# Patient Record
Sex: MALE | Race: WHITE
[De-identification: names, ages, dates, MRNs, and addresses within clinical notes are randomized per-mention and may not be internally consistent; named-entity substitution may affect disease eponyms.]

---

## 2017-12-18 ENCOUNTER — HOSPITAL ENCOUNTER (INPATIENT)
Dept: HOSPITAL 53 - M ED | Age: 77
LOS: 2 days | Discharge: HOME | DRG: 194 | End: 2017-12-20
Attending: INTERNAL MEDICINE | Admitting: INTERNAL MEDICINE
Payer: MEDICARE

## 2017-12-18 VITALS — SYSTOLIC BLOOD PRESSURE: 110 MMHG | DIASTOLIC BLOOD PRESSURE: 57 MMHG

## 2017-12-18 VITALS — SYSTOLIC BLOOD PRESSURE: 115 MMHG | DIASTOLIC BLOOD PRESSURE: 56 MMHG

## 2017-12-18 VITALS — HEIGHT: 68 IN | WEIGHT: 166.45 LBS | BODY MASS INDEX: 25.23 KG/M2

## 2017-12-18 DIAGNOSIS — J44.9: ICD-10-CM

## 2017-12-18 DIAGNOSIS — Z79.82: ICD-10-CM

## 2017-12-18 DIAGNOSIS — E78.5: ICD-10-CM

## 2017-12-18 DIAGNOSIS — N40.0: ICD-10-CM

## 2017-12-18 DIAGNOSIS — I10: ICD-10-CM

## 2017-12-18 DIAGNOSIS — Z94.81: ICD-10-CM

## 2017-12-18 DIAGNOSIS — E11.9: ICD-10-CM

## 2017-12-18 DIAGNOSIS — J18.9: Primary | ICD-10-CM

## 2017-12-18 DIAGNOSIS — K21.9: ICD-10-CM

## 2017-12-18 DIAGNOSIS — Z79.84: ICD-10-CM

## 2017-12-18 DIAGNOSIS — Z92.21: ICD-10-CM

## 2017-12-18 DIAGNOSIS — Z95.5: ICD-10-CM

## 2017-12-18 DIAGNOSIS — Z87.891: ICD-10-CM

## 2017-12-18 DIAGNOSIS — D84.9: ICD-10-CM

## 2017-12-18 DIAGNOSIS — Z88.8: ICD-10-CM

## 2017-12-18 DIAGNOSIS — I25.10: ICD-10-CM

## 2017-12-18 DIAGNOSIS — Z79.899: ICD-10-CM

## 2017-12-18 LAB
ALBUMIN SERPL BCG-MCNC: 3.2 GM/DL (ref 3.2–5.2)
ALBUMIN/GLOB SERPL: 1.03 {RATIO} (ref 1–1.93)
ALP SERPL-CCNC: 66 U/L (ref 45–117)
ALT SERPL W P-5'-P-CCNC: 27 U/L (ref 12–78)
ANION GAP SERPL CALC-SCNC: 10 MEQ/L (ref 8–16)
AST SERPL-CCNC: 26 U/L (ref 7–37)
BASOPHILS # BLD AUTO: 0 10^3/UL (ref 0–0.2)
BASOPHILS NFR BLD AUTO: 0.2 % (ref 0–1)
BILIRUB CONJ SERPL-MCNC: 0.1 MG/DL (ref 0–0.2)
BILIRUB SERPL-MCNC: 0.4 MG/DL (ref 0.2–1)
BUN SERPL-MCNC: 35 MG/DL (ref 7–18)
CALCIUM SERPL-MCNC: 8.4 MG/DL (ref 8.8–10.2)
CHLORIDE SERPL-SCNC: 104 MEQ/L (ref 98–107)
CO2 SERPL-SCNC: 26 MEQ/L (ref 21–32)
CREAT SERPL-MCNC: 1.3 MG/DL (ref 0.7–1.3)
DIGOXIN SERPL-MCNC: 1.1 NG/ML (ref 0.5–2)
EOSINOPHIL # BLD AUTO: 0 10^3/UL (ref 0–0.5)
EOSINOPHIL NFR BLD AUTO: 0.2 % (ref 0–3)
ERYTHROCYTE [DISTWIDTH] IN BLOOD BY AUTOMATED COUNT: 13.7 % (ref 11.5–14.5)
GFR SERPL CREATININE-BSD FRML MDRD: 57.3 ML/MIN/{1.73_M2} (ref 42–?)
GLUCOSE SERPL-MCNC: 205 MG/DL (ref 83–110)
IMM GRANULOCYTES NFR BLD: 0.5 % (ref 0–0)
LYMPHOCYTES # BLD AUTO: 1.1 10^3/UL (ref 1.5–4.5)
LYMPHOCYTES NFR BLD AUTO: 8.7 % (ref 24–44)
MCH RBC QN AUTO: 36.1 PG (ref 27–33)
MCHC RBC AUTO-ENTMCNC: 34 G/DL (ref 32–36.5)
MCV RBC AUTO: 106.1 FL (ref 80–96)
MONOCYTES # BLD AUTO: 0.6 10^3/UL (ref 0–0.8)
MONOCYTES NFR BLD AUTO: 4.6 % (ref 0–5)
NEUTROPHILS # BLD AUTO: 10.6 10^3/UL (ref 1.8–7.7)
NEUTROPHILS NFR BLD AUTO: 85.8 % (ref 36–66)
NRBC BLD AUTO-RTO: 0 % (ref 0–0)
PLATELET # BLD AUTO: 209 10^3/UL (ref 150–450)
POTASSIUM SERPL-SCNC: 4.8 MEQ/L (ref 3.5–5.1)
PROT SERPL-MCNC: 6.3 GM/DL (ref 6.4–8.2)
SODIUM SERPL-SCNC: 140 MEQ/L (ref 136–145)
WBC # BLD AUTO: 12.3 10^3/UL (ref 4–10)

## 2017-12-18 RX ADMIN — METOPROLOL TARTRATE SCH MG: 25 TABLET, FILM COATED ORAL at 21:59

## 2017-12-18 RX ADMIN — FLUTICASONE PROPIONATE AND SALMETEROL XINAFOATE SCH PUFF: 230; 21 AEROSOL, METERED RESPIRATORY (INHALATION) at 20:52

## 2017-12-18 RX ADMIN — MONTELUKAST SODIUM SCH MG: 10 TABLET, FILM COATED ORAL at 21:56

## 2017-12-18 RX ADMIN — FOLIC ACID SCH MG: 1 TABLET ORAL at 16:31

## 2017-12-18 RX ADMIN — VORICONAZOLE SCH MG: 200 TABLET ORAL at 21:56

## 2017-12-18 RX ADMIN — PIPERACILLIN SODIUM AND TAZOBACTAM SODIUM SCH MLS/HR: 36; 4.5 INJECTION, POWDER, FOR SOLUTION INTRAVENOUS at 13:50

## 2017-12-18 RX ADMIN — DIGOXIN SCH MG: 125 TABLET ORAL at 16:31

## 2017-12-18 RX ADMIN — PIPERACILLIN SODIUM AND TAZOBACTAM SODIUM SCH MLS/HR: 36; 4.5 INJECTION, POWDER, FOR SOLUTION INTRAVENOUS at 18:00

## 2017-12-18 RX ADMIN — DUTASTERIDE SCH MG: 0.5 CAPSULE, LIQUID FILLED ORAL at 21:56

## 2017-12-18 RX ADMIN — CETIRIZINE HYDROCHLORIDE SCH MG: 10 TABLET, FILM COATED ORAL at 21:56

## 2017-12-18 RX ADMIN — Medication SCH EA: at 23:37

## 2017-12-18 RX ADMIN — ACYCLOVIR SCH MG: 200 CAPSULE ORAL at 21:55

## 2017-12-18 RX ADMIN — ATORVASTATIN CALCIUM SCH MG: 20 TABLET, FILM COATED ORAL at 21:56

## 2017-12-18 RX ADMIN — DEXTROSE MONOHYDRATE SCH MLS/HR: 50 INJECTION, SOLUTION INTRAVENOUS at 16:30

## 2017-12-18 RX ADMIN — DOCUSATE SODIUM SCH MG: 100 CAPSULE, LIQUID FILLED ORAL at 21:56

## 2017-12-18 RX ADMIN — Medication SCH UNITS: at 16:30

## 2017-12-18 RX ADMIN — OMEPRAZOLE SCH MG: 20 CAPSULE, DELAYED RELEASE ORAL at 16:31

## 2017-12-18 RX ADMIN — HYDROXYCHLOROQUINE SULFATE SCH MG: 200 TABLET, FILM COATED ENTERAL at 21:56

## 2017-12-18 NOTE — REPUSA
CLINICAL HISTORY: Dyspnea, exclude PE.

TECHNIQUE: Multiple incremental axial, coronal and oblique images are obtained from the thoracic inle
t to the upper abdomen. Intravenous contrast material was administered as per pulmonary embolism prot
ocol.

COMMENTS:

Comparison to prior exam performed on 12/17/2014.

Small left pleural effusion.

Left lower lobe pulmonary consolidation.

Prior CABG.

2.5 cm left hypoechoic cystic nodule/cyst. Mildly increased in size.

Complete resolution of the patient noted large pericardial effusion.

Minimal bilateral multifocal air trapping/hypoventilatory changes of the lungs.

Significant decrease in bilateral ground glass densities of the lungs.

Unchanged bilateral chronic subdural minimal interstitial pulmonary thickening.

Unchanged paraseptal emphysema.

Multiple gallstones are noted.

Diffusely thickened gallbladder.

Unchanged cardiomegaly.

There is excellent opacification of pulmonary arterial system without evidence for pulmonary embolism
. Aorta is of normal caliber without evidence for dissection or aneurysm.

There is no evidence of hilar or mediastinal lymphadenopathy.

Images of the upper abdomen demonstrate no evidence of adrenal mass.

The bony structures are free of lytic or blastic lesions. Multilevel degenerative changes are seen in
volving the visualized thoracolumbar spine.

Scattered calcifications are seen involving the aorta and major branches compatible with atherosclero
sis.

IMPRESSION:

No evidence for pulmonary embolism.

Increase in the size of the left lower lobe pulmonary consolidation.

Interval appearance of a small left pleural effusion.

Mild increase in the size of the left thyroid cyst/nodule.

Resolution of pericardial effusion.

Decreased bilateral patchy groundglass densities of the lungs.

Cholelithiasis. The gallbladder was not fully included on the prior exam.

Mild thickening of the wall of the gallbladder.

Thank you for your kind referral of this patient.

     Electronically signed by FAUSTO LORENZ MD on 12/18/2017 08:09:16 AM ET

## 2017-12-18 NOTE — PHACANCOPD
PHARMACY VANCOMYCIN DOSING


Pt Demographics


Demographics


Patient Age:77 , Weight:68.180  , Gender: male


Adjusted Body Weight


Date: 12/18/17, Adjusted Body Weight: [68.18] Kg





Events Past 24 Hours


Events Past 24 Hours:  YES: Dialysis, Diuretic Therapy, Change in CrCl, Fever, 

Elevation in WBC, Pending Diagnostics, Pending Procedures, Other





Vancomycin


Vancomycin indication:  pna


Vancomycin Target Ranges:  15-20 mcg/ml


Vancomycin Load Y/N:  Yes


Load Dose Date Time


Vancomycin Load Dose:  1.5g       Date: 12/18/17        Time: 1300


Vancomycin Dose


Date: 12/18/17. Current Vancomycin Dose: [1g iv q24h]


Intermittent Dosing?:  No





Labs


Micro





Microbiology


12/18/17 Blood Culture, Received


           Pending


12/18/17 Blood Culture, Received


           Pending


12/18/17 Influenza Virus Type A Antigen - Final, Complete


           


12/18/17 Influenza Virus Type B Antigen - Final, Complete


Creatinine Clearance


Date:12/18/17. Creatinine Clearance: [47.5ml/min].


Pending Labs


blood culture





Assessment and Plan


Maintaining Current Dose?:  Yes


Reason for dose change:  No Dose Change





Pharmacist Note


Pharmacist Note


Date: 12/18/17. Pharmacist note: Pt is a 77 year old man being treated for 

pneumonia goal trough 15-20mcg/ml. The patient has been treated with vancomycin 

at [facility] briefly in 2014. To achieve goal a 1.5g loading dose will start 12/18/17 

@13. Maintenance therapy will consist of 1g iv q24 hours starting 12/19 @ 13:

00. We will continue to monitor and adjust dose as needed.











IAN NAVA PHARMACY Dec 18, 2017 11:42

## 2017-12-18 NOTE — HPEPDOC
[facility] Medical History & Physical


Date of Admission


Dec 18, 2017





History and Physical


PRIMARY CARE PROVIDER: Dr. Oleg Bradshaw





ATTENDING: Dr. Dell Graves





CHIEF COMPLAINT: SOB/Cough





HISTORY OF PRESENT ILLNESS: 


Some 27-year-old male past history of myelodysplastic disorder status post 


 bone marrow transplant 2012, status post pqnia-kqilad-cnxz disease in 2014, 

currently on immunosuppressants, history of CAD status post CABG 3 in 2014, 

diabetes, pericardial effusion in 2014 who presents complaining of cough and 

shortness of breath. 





Patient states he started having shortness of breath and a nonproductive cough 

starting yesterday which progressively worsened overnight. Patient denies any 

fevers or chills. No sick contacts. No recent travels. Has not been 

hospitalized in the last 90 days.





Patient does have left-sided chest pain that is double nature and worse with 

exertion, in nonreproducible. Patient states this pain pleuritic in nature. 

Pain has resolved at this time.  Patient denies orthopnea/PND/lower extremity 

edema/syncopal episodes.





In the ED patient was found to have pneumonia and started on azithromycin and 

Rocephin.











PAST MEDICAL HISTORY: As per HPI





PAST SURGICAL HISTORY: Appendectomy, CABG, BM Transplant, Adenoids





SOCIAL HISTORY: H/o tobacco abuse quit in 1977. Denies alcohol. Lives with wife.





FAMILY HISTORY: Non contributory 





ALLERGIES: Please see below.





REVIEW OF SYSTEMS:


HEENT: Denies sore throat/headache


CARDIOVASCULAR: Denies chest pain/palpitations


RESPIRATORY: + shortness of breath/cough


GASTROINTESTINAL: denies nausea/vomiting


GENITOURINARY: Denies dysuria/urinary urgency.


MUSCULOSKELETAL: Denies myalgias/arthralgias


NEUROLOGICAL: Denies any focal weakness








HOME MEDICATIONS: Please see below. 





PHYSICAL EXAMINATION:


Vitals: (see below) 


General: No acute distress, laying comfortably in bed.


HEENT: Moist mucous membranes.


Neck: No JVD or lymphadenopathy


Cardiac: RRR, No murmurs


Pulm:  Diminished breath sounds and course crackles left base. No wheezing, 

rhonchi


Abd: NT/ND + BS


Ext: No edema or cyanosis





LABORATORY DATA: See below.





IMAGING: 


CTA Chest 12/18/17 


IMPRESSION:


No evidence for pulmonary embolism.


Increase in the size of the left lower lobe pulmonary consolidation.


Interval appearance of a small left pleural effusion.


Mild increase in the size of the left thyroid cyst/nodule.


Resolution of pericardial effusion.


Decreased bilateral patchy groundglass densities of the lungs.


Cholelithiasis. The gallbladder was not fully included on the prior exam.


Mild thickening of the wall of the gallbladder.





MICROBIOLOGY: Please see below. 





ASSESSMENT/PLAN: 


1. Community acquired pneumonia- given that the patient's immunosuppressed, we 

will start the patient on broad-spectrum antibiotics with vancomycin and Zosyn, 

pending cultures. Patient is not hypoxic. Leukocytosis of 23. Afebrile. Blood/

sputum cultures.


2. History of CAD status post bypass 3 continue home meds


3. Pleuritic chest pain- CTA negative for PE. Given the patient's history of 

pericardial effusion, we will obtain echocardiogram. Trend cardiac enzymes.


4. History jxbko-bxplbr-khxn disease.


5. History of myelodysplastic syndrome on immunosuppressants status post bone 

marrow transplant 2012


6. Diabetes mellitus- hold metformin. Sliding scale insulin.





DVT prophylaxis SCDs





I have called and discussed the case with the patient's transplants seen (Dr. Ross's office), spoke with the PA, who were agreeable to the current antibiotic 

regimen.  These call their office with any questions or concerns and [phone].





Vital Signs





Vital Signs








  Date Time  Temp Pulse Resp B/P (MAP) Pulse Ox O2 Delivery O2 Flow Rate FiO2


 


12/18/17 13:17  80  120/58 (78) 96   


 


12/18/17 11:02 98.9  16     


 


12/18/17 05:22      Room Air  











Laboratory Data


Labs 24H


Laboratory Tests 2


12/18/17 05:40: 


12/18/17 05:41: 


Immature Granulocyte % (Auto) 0.5H, White Blood Count 12.3H, Red Blood Count 

2.94L, Hemoglobin 10.6L, Hematocrit 31.2L, Mean Corpuscular Volume 106.1H, Mean 

Corpuscular Hemoglobin 36.1H, Mean Corpuscular Hemoglobin Concent 34.0, Red 

Cell Distribution Width 13.7, Platelet Count 209, Neutrophils (%) (Auto) 85.8H, 

Lymphocytes (%) (Auto) 8.7L, Monocytes (%) (Auto) 4.6, Eosinophils (%) (Auto) 

0.2, Basophils (%) (Auto) 0.2, Neutrophils # (Auto) 10.6H, Lymphocytes # (Auto) 

1.1L, Monocytes # (Auto) 0.6, Eosinophils # (Auto) 0.0, Basophils # (Auto) 0.0, 

Immature Granulocyte # (Auto) 0.1H, Nucleated Red Blood Cells % (auto) 0.0, 

Anion Gap 10, Glomerular Filtration Rate 57.3, Calcium Level 8.4L, Aspartate 

Amino Transf (AST/SGOT) 26, Alanine Aminotransferase (ALT/SGPT) 27, Alkaline 

Phosphatase 66, Total Bilirubin 0.4, Direct Bilirubin 0.1, Total Creatine 

Kinase 129, Creatine Kinase MB 1.0, Creatine Kinase MB Relative Index 0.77, 

Troponin I 0.05, NT-Pro-B-Type Natriuretic Peptide 4125H, Total Protein 6.3L, 

Albumin 3.2, Albumin/Globulin Ratio 1.03, Digoxin Level 1.1


12/18/17 07:39: 


Urine Appearance HAZY, Urine Color YELLOW, Urine pH 5.0, Urine Specific Gravity 

1.024, Urine Protein 3+H, Urine Glucose (UA) 3+H, Urine Ketones TRACEH, Urine 

Urobilinogen 0.2, Urine Bilirubin NEGATIVE, Urine Leukocyte Esterase NEGATIVE, 

Urine Blood NEGATIVE, Urine Nitrite NEGATIVE, Urine WBC (Auto) 2, Urine RBC (

Auto) 3, Urine Hyaline Casts (Auto) 0, Urine Bacteria (Auto) NEGATIVE, Urine 

Squamous Epithelial Cells 0, Urine Mucus (Auto) SMALL, Urine Sperm (Auto) 


12/18/17 08:44: Lactic Acid Level 1.3


CBC/BMP


Laboratory Tests


12/18/17 05:41








Red Blood Count 2.94 L, Mean Corpuscular Volume 106.1 H, Mean Corpuscular 

Hemoglobin 36.1 H, Mean Corpuscular Hemoglobin Concent 34.0, Red Cell 

Distribution Width 13.7, Neutrophils (%) (Auto) 85.8 H, Lymphocytes (%) (Auto) 

8.7 L, Monocytes (%) (Auto) 4.6, Eosinophils (%) (Auto) 0.2, Basophils (%) (Auto

) 0.2, Neutrophils # (Auto) 10.6 H, Lymphocytes # (Auto) 1.1 L, Monocytes # (

Auto) 0.6, Eosinophils # (Auto) 0.0, Basophils # (Auto) 0.0


Microbiology





Microbiology


12/18/17 Blood Culture, Received


           Pending


12/18/17 Blood Culture, Received


           Pending


12/18/17 Influenza Virus Type A Antigen - Final, Complete


           


12/18/17 Influenza Virus Type B Antigen - Final, Complete





Home Medications


Scheduled


 (mg-Plus Protein 133 mg) 1 Tab Tab, 1 TAB PO TID


 (Imatinib Mesylate) 100 Mg Tab, 200 MG PO DAILY


   LUNCHTIME 


 (Digoxin) 125 Mcg Tab, 125 MCG PO DAILY


 (Fish Oil 500 mg) 1 Cap Cap, 2 CAP PO BID


Acyclovir (Acyclovir) 400 Mg Tab, 400 MG PO BID


Alendronate Sodium (Alendronate Sodium) 10 Mg Tab, 10 MG PO DAILY


Amoxicillin (Amoxicillin) 500 Mg Cap, 2,000 MG PO ASDIRECTED


Aspirin (Aspirin EC Lo-Dose) 81 Mg Tab, 81 MG PO QHS


Atorvastatin Calcium (Atorvastatin Calcium) 20 Mg Tab, 20 MG PO QHS


Calcium/Vitamin D (Calcium 600 + D 600-400 mg-Unit) 1 Tab Tab, 1 TAB PO BID


Cetirizine HCl (Cetirizine HCl) 10 Mg Tab, 10 MG PO QHS


Cholecalciferol ( Ultra Strength) 2,000 Unit Cap, 6,000 UNIT PO DAILY


Docusate Sodium (Colace) 100 Mg Cap, 100 MG PO BID


Doxycycline Hyclate (Doxycycline) 100 Mg Cap, 100 MG PO DAILY


   LUNCHTIME 


Dutasteride (Avodart) 0.5 Mg Cap, 0.5 MG PO QHS


Folic Acid (Folic Acid) 1 Mg Tab, 1 MG PO DAILY


Hydroxychloroquine Sulfate (Hydroxychloroquine Sulfat) 200 Mg Tab, 200 MG PO BID


Metformin Hydrochloride (Glucophage) 500 Mg Tab, 500 MG PO QHS


   HOLD DUE TO RECENT USE OF IV CONTRAST 


Metoprolol Tartrate (Metoprolol Tartrate) 25 Mg Tab, 12.5 MG PO BID


Montelukast Sodium (Singulair) 10 Mg Tab, 10 MG PO QPM


Omeprazole (Omeprazole) 20 Mg Cap, 20 MG PO DAILY


Salmeterol/Fluticasone (Advair Diskus 500-50 Mcg/Dose) 28 Puff/Inhaler Aerp, 1 

PUFF INH BID


Trimethoprim/Sulfamethoxazole (Bactrim Ds 800-160 mg) 1 Tab Tab, 1 TAB PO 3XW


   MONDAY,WEDNESDAY,FRIDAY 


Voriconazole (Voriconazole) 200 Mg Tab, 200 MG PO BID


[Tacrolimus Oral Susp]  ML, 0.8 MG PO BID


   0900,2100- PT BRINGING IN MEDICATION 





Allergies


Coded Allergies:  


     No Known Drug Allergy (Verified  Allergy, Unknown, 4/7/13)


     Lidocaine (Verified  Adverse Reaction, Severe, CONVULSIONS FROM ALL "MICHAEL

", 4/7/13)











DELL GRAVES MD Dec 18, 2017 15:04

## 2017-12-18 NOTE — PHACANCOPD
PHARMACY VANCOMYCIN DOSING


Pt Demographics


Demographics


Patient Age:77 , Weight:68.180  , Gender: male


Adjusted Body Weight


Date: 12/18/17, Adjusted Body Weight: [68.18] Kg





Events Past 24 Hours


Events Past 24 Hours:  NO: Dialysis, Diuretic Therapy, Change in CrCl, Fever, 

Elevation in WBC, Pending Diagnostics, Pending Procedures, Other





Vancomycin


Vancomycin indication:  pna


Vancomycin Target Ranges:  15-20 mcg/ml


Vancomycin Load Y/N:  Yes


Load Dose Date Time


Vancomycin Load Dose:  1.5g       Date: 12/18/17        Time: 1300


Vancomycin Dose


Date: 12/18/17. Current Vancomycin Dose: [1g iv q24h]


Intermittent Dosing?:  No





Labs


Labs











Item Value  Date Time


 


White Blood Count 12.3 10^3/uL H 12/18/17 0541


 


Creatinine 1.30 MG/DL 12/18/17 0541








Micro





Microbiology


12/18/17 Blood Culture, Received


           Pending


12/18/17 Blood Culture, Received


           Pending


12/18/17 Influenza Virus Type A Antigen - Final, Complete


           


12/18/17 Influenza Virus Type B Antigen - Final, Complete


Creatinine Clearance


Date:12/18/17. Creatinine Clearance: [47.5ml/min].


Pending Labs


blood culture





Assessment and Plan


Maintaining Current Dose?:  Yes


Reason for dose change:  No Dose Change





Pharmacist Note


Pharmacist Note


Date: 12/18/17. Pharmacist note: Pt is a 77 year old man being treated for 

pneumonia goal trough 15-20mcg/ml. The patient has been treated with vancomycin 

at [facility] briefly in 2014. To achieve goal a 1.5g loading dose will start 12/18/17 

@12. Maintenance therapy will consist of 1g iv q24 hours starting 12/19 @ 12:

00. We will continue to monitor and adjust dose as needed.











IAN NAVA PHARMACY Dec 18, 2017 11:41

## 2017-12-18 NOTE — REP
Portable chest x-ray:  Single view.

 

History:  Chest pain.

 

Comparison study:  December 19, 2014.

 

Findings:  There is elevation of the left hemidiaphragm and blunting of the left

lateral pleural angle consistent with small left pleural effusion.  The patient

is status post prior median sternotomy.  Remaining lung fields are clear.  No

significant bony abnormality is seen.

 

Impression:

 

Probable left pleural effusion.  Prior sternotomy.

 

 

Signed by

Best Acosta MD 12/18/2017 03:49 P

## 2017-12-19 VITALS — SYSTOLIC BLOOD PRESSURE: 123 MMHG | DIASTOLIC BLOOD PRESSURE: 65 MMHG

## 2017-12-19 VITALS — DIASTOLIC BLOOD PRESSURE: 62 MMHG | SYSTOLIC BLOOD PRESSURE: 103 MMHG

## 2017-12-19 VITALS — DIASTOLIC BLOOD PRESSURE: 53 MMHG | SYSTOLIC BLOOD PRESSURE: 109 MMHG

## 2017-12-19 VITALS — DIASTOLIC BLOOD PRESSURE: 67 MMHG | SYSTOLIC BLOOD PRESSURE: 138 MMHG

## 2017-12-19 VITALS — SYSTOLIC BLOOD PRESSURE: 113 MMHG | DIASTOLIC BLOOD PRESSURE: 50 MMHG

## 2017-12-19 VITALS — SYSTOLIC BLOOD PRESSURE: 127 MMHG | DIASTOLIC BLOOD PRESSURE: 59 MMHG

## 2017-12-19 LAB
ANION GAP SERPL CALC-SCNC: 8 MEQ/L (ref 8–16)
BASOPHILS # BLD AUTO: 0 10^3/UL (ref 0–0.2)
BASOPHILS NFR BLD AUTO: 0.2 % (ref 0–1)
BUN SERPL-MCNC: 39 MG/DL (ref 7–18)
CALCIUM SERPL-MCNC: 8.2 MG/DL (ref 8.8–10.2)
CHLORIDE SERPL-SCNC: 104 MEQ/L (ref 98–107)
CO2 SERPL-SCNC: 24 MEQ/L (ref 21–32)
CREAT SERPL-MCNC: 1.2 MG/DL (ref 0.7–1.3)
EOSINOPHIL # BLD AUTO: 0.1 10^3/UL (ref 0–0.5)
EOSINOPHIL NFR BLD AUTO: 0.5 % (ref 0–3)
ERYTHROCYTE [DISTWIDTH] IN BLOOD BY AUTOMATED COUNT: 13.9 % (ref 11.5–14.5)
GFR SERPL CREATININE-BSD FRML MDRD: > 60 ML/MIN/{1.73_M2} (ref 42–?)
GLUCOSE SERPL-MCNC: 105 MG/DL (ref 83–110)
IMM GRANULOCYTES NFR BLD: 0.7 % (ref 0–0)
LYMPHOCYTES # BLD AUTO: 1.3 10^3/UL (ref 1.5–4.5)
LYMPHOCYTES NFR BLD AUTO: 12.6 % (ref 24–44)
MAGNESIUM SERPL-MCNC: 1.6 MG/DL (ref 1.8–2.4)
MCH RBC QN AUTO: 35.4 PG (ref 27–33)
MCHC RBC AUTO-ENTMCNC: 34.2 G/DL (ref 32–36.5)
MCV RBC AUTO: 103.6 FL (ref 80–96)
MONOCYTES # BLD AUTO: 0.7 10^3/UL (ref 0–0.8)
MONOCYTES NFR BLD AUTO: 7 % (ref 0–5)
NEUTROPHILS # BLD AUTO: 8.4 10^3/UL (ref 1.8–7.7)
NEUTROPHILS NFR BLD AUTO: 79 % (ref 36–66)
NRBC BLD AUTO-RTO: 0 % (ref 0–0)
PLATELET # BLD AUTO: 181 10^3/UL (ref 150–450)
POTASSIUM SERPL-SCNC: 4.1 MEQ/L (ref 3.5–5.1)
SODIUM SERPL-SCNC: 136 MEQ/L (ref 136–145)
WBC # BLD AUTO: 10.6 10^3/UL (ref 4–10)

## 2017-12-19 RX ADMIN — Medication SCH EA: at 21:48

## 2017-12-19 RX ADMIN — HYDROXYCHLOROQUINE SULFATE SCH MG: 200 TABLET, FILM COATED ENTERAL at 08:52

## 2017-12-19 RX ADMIN — Medication SCH EA: at 08:50

## 2017-12-19 RX ADMIN — HYDROXYCHLOROQUINE SULFATE SCH MG: 200 TABLET, FILM COATED ENTERAL at 21:43

## 2017-12-19 RX ADMIN — FOLIC ACID SCH MG: 1 TABLET ORAL at 08:49

## 2017-12-19 RX ADMIN — PIPERACILLIN SODIUM AND TAZOBACTAM SODIUM SCH MLS/HR: 36; 4.5 INJECTION, POWDER, FOR SOLUTION INTRAVENOUS at 17:58

## 2017-12-19 RX ADMIN — VORICONAZOLE SCH MG: 200 TABLET ORAL at 08:49

## 2017-12-19 RX ADMIN — CETIRIZINE HYDROCHLORIDE SCH MG: 10 TABLET, FILM COATED ORAL at 21:43

## 2017-12-19 RX ADMIN — MONTELUKAST SODIUM SCH MG: 10 TABLET, FILM COATED ORAL at 21:43

## 2017-12-19 RX ADMIN — METOPROLOL TARTRATE SCH MG: 25 TABLET, FILM COATED ORAL at 21:46

## 2017-12-19 RX ADMIN — DIGOXIN SCH MG: 125 TABLET ORAL at 08:49

## 2017-12-19 RX ADMIN — DOCUSATE SODIUM SCH MG: 100 CAPSULE, LIQUID FILLED ORAL at 21:43

## 2017-12-19 RX ADMIN — METOPROLOL TARTRATE SCH MG: 25 TABLET, FILM COATED ORAL at 08:48

## 2017-12-19 RX ADMIN — VORICONAZOLE SCH MG: 200 TABLET ORAL at 21:46

## 2017-12-19 RX ADMIN — FLUTICASONE PROPIONATE AND SALMETEROL XINAFOATE SCH PUFF: 230; 21 AEROSOL, METERED RESPIRATORY (INHALATION) at 20:32

## 2017-12-19 RX ADMIN — ATORVASTATIN CALCIUM SCH MG: 20 TABLET, FILM COATED ORAL at 21:43

## 2017-12-19 RX ADMIN — FLUTICASONE PROPIONATE AND SALMETEROL XINAFOATE SCH PUFF: 230; 21 AEROSOL, METERED RESPIRATORY (INHALATION) at 08:57

## 2017-12-19 RX ADMIN — ACYCLOVIR SCH MG: 200 CAPSULE ORAL at 21:43

## 2017-12-19 RX ADMIN — OMEPRAZOLE SCH MG: 20 CAPSULE, DELAYED RELEASE ORAL at 08:50

## 2017-12-19 RX ADMIN — PIPERACILLIN SODIUM AND TAZOBACTAM SODIUM SCH MLS/HR: 36; 4.5 INJECTION, POWDER, FOR SOLUTION INTRAVENOUS at 05:37

## 2017-12-19 RX ADMIN — SODIUM CHLORIDE SCH UNITS: 4.5 INJECTION, SOLUTION INTRAVENOUS at 21:42

## 2017-12-19 RX ADMIN — DEXTROSE MONOHYDRATE SCH MLS/HR: 50 INJECTION, SOLUTION INTRAVENOUS at 12:47

## 2017-12-19 RX ADMIN — DUTASTERIDE SCH MG: 0.5 CAPSULE, LIQUID FILLED ORAL at 21:43

## 2017-12-19 RX ADMIN — PIPERACILLIN SODIUM AND TAZOBACTAM SODIUM SCH MLS/HR: 36; 4.5 INJECTION, POWDER, FOR SOLUTION INTRAVENOUS at 11:57

## 2017-12-19 RX ADMIN — PIPERACILLIN SODIUM AND TAZOBACTAM SODIUM SCH MLS/HR: 36; 4.5 INJECTION, POWDER, FOR SOLUTION INTRAVENOUS at 23:49

## 2017-12-19 RX ADMIN — PIPERACILLIN SODIUM AND TAZOBACTAM SODIUM SCH MLS/HR: 36; 4.5 INJECTION, POWDER, FOR SOLUTION INTRAVENOUS at 02:41

## 2017-12-19 RX ADMIN — ACYCLOVIR SCH MG: 200 CAPSULE ORAL at 08:48

## 2017-12-19 RX ADMIN — Medication SCH UNITS: at 08:48

## 2017-12-19 RX ADMIN — SODIUM CHLORIDE SCH UNITS: 4.5 INJECTION, SOLUTION INTRAVENOUS at 17:57

## 2017-12-19 RX ADMIN — DOCUSATE SODIUM SCH MG: 100 CAPSULE, LIQUID FILLED ORAL at 08:50

## 2017-12-19 NOTE — ECGEPIP
Stationary ECG Study

                           [facility] - ED

                                       

                                       Test Date:    2017

Pat Name:     UMA ROBERTS            Department:   

Patient ID:   [id number]                 Room:         -

Gender:       M                        Technician:   

:          [id number]               Requested By: PORTIA GRACE

Order Number: [id number]     Reading MD:   Tasia Jett

                                 Measurements

Intervals                              Axis          

Rate:         85                       P:            

ID:           0                        QRS:          7

QRSD:         98                       T:            10

QT:           359                                    

QTc:          427                                    

                           Interpretive Statements

SUPRAVENTRICULAR RHYTHM

INFERIOR MYOCARDIAL INFARCTION, OF INDETERMINATE AGE

BASELINE ARTIFACT LIMITS INTERPRETATION

NSTTW ABNORMALITY

Electronically Signed On 2017 8:33:30 EST by Tasia Jett

## 2017-12-19 NOTE — ECHO
DATE OF PROCEDURE:  12/18/2017

 

AGE: 77

GENDER: Male

REFERRING PHYSICIAN: Dr. Graves.

HEIGHT: 68 inches.

WEIGHT: 149 pounds.

BODY SURFACE AREA: 1.81 m2.

INPATIENT:  PCU Room 3224.

 

INDICATION: Chest pain (other).

 

MEASUREMENTS:

 

2D MEASUREMENTS:

RV - 3.6 cm

LV- 4.9 cm

Septum - 1.3 cm

Posterior wall - 1.3 cm

Aortic root - 3.8 cm

LA - 4.5 cm

LVEF - 70%

 

DOPPLER MEASUREMENTS:

AV - 1.1 m/s

LVOT - 0.77 m/s

LVOT diameter - 2.1 cm

MV-E: 75  A: 77  EA ratio 1.0

Early mitral deacceleration time - 236 ms

E-prime - 5.4

A-prime - 8.6

E/E prime ratio - 14

PV - 0.8 m/s

Pulmonary artery acceleration time - 99 ms

RVSP - 34 mmHg

IVC - 1.8 cm

 

COMMENT: Normal sinus rhythm without interventricular conduction disturbance.

 

Mildly dilated left atrium but normal left ventricular size.  Right heart chamber

sizes were also normal.  LV wall thickness was mildly increased symmetrically.

On real-time imaging from the parasternal and apical projections, wall motion was

symmetrical and hyperkinetic.  Slightly thickened mitral annulus but normal

leaflet thickness and excursion with no posterior systolic buckling.  Three equal

size aortic cusps with slight asymmetrical thickening of the noncoronary cusp,

but adequate cusp separation. Aortic root was slightly dilated.  No apparent

intracardiac mass or pericardial effusion.

 

Color flow Doppler study taken from the parasternal and apical projection showed

mild tricuspid and no apparent mitral or aortic insufficiency.

 

Guided continuous wave Doppler of his aortic valve showed a normal peak systolic

velocity against LV outflow tract obstruction.

 

Pulsed and continuous wave Doppler of his LV inflow tract taken from the apical

four-chamber projection showed normal diastolic filling velocities against mitral

stenosis.  The filling pattern was normal.  However, his early mitral

deceleration time was prolonged and tissue Doppler of his mitral annulus also

suggested a degree of impaired LV diastolic function.  Current estimated mean

left atrial pressure was upper limits of normal.

 

Pulsed and continuous wave Doppler of his pulmonary trunk showed a normal peak

systolic velocity against RV outflow tract obstruction.  His pulmonary artery

acceleration time was abbreviated consistent with a mildly elevated pulmonary

vascular resistance.

 

Guided continuous wave Doppler of his tricuspid valve allowed our estimation of

his right ventricular systolic pressure (upper limits of normal to mildly

increased).  His inferior vena cava was of normal size with normal respiratory

collapse against an elevated central venous pressure.

 

CONCLUSIONS:

Mild concentric left ventricle hypertrophy with hyperkinetic wall motion.

Mildly dilated left atrium with Doppler evidence of impaired LV diastolic

relaxation but current mean left atrial pressure only slightly elevated.

Normal right heart chamber sizes and motion with borderline elevated estimated

pulmonary arterial pressure.

Normal IVC size and collapse against an elevated central venous pressure.

Subtle degenerative changes of the mitral and aortic valvular apparatus with no

functional valvular abnormality.

## 2017-12-19 NOTE — ECGEPIP
Stationary ECG Study

                           [facility] - ED

                                       

                                       Test Date:    2017

Pat Name:     UMA ROBERTS            Department:   

Patient ID:   [id number]                 Room:         [place] [address]

Gender:       M                        Technician:   richard

:          1940               Requested By: PORTIA GRACE

Order Number: [id number]     Reading MD:   Tasia Jett

                                 Measurements

Intervals                              Axis          

Rate:         126                      P:            

W[place]:           0                        QRS:          36

QRSD:         93                       T:            9

QT:           280                                    

QTc:          407                                    

                           Interpretive Statements

ATRIAL FIBRILLATION WITH RAPID VENTRICULAR RESPONSE

ANTEROSEPTAL MYOCARDIAL INFARCTION, OF INDETERMINATE AGE

NSTTW ABNORMALITY

Electronically Signed On 2017 8:35:05 EST by Tasia Jett

## 2017-12-20 VITALS — SYSTOLIC BLOOD PRESSURE: 135 MMHG | DIASTOLIC BLOOD PRESSURE: 70 MMHG

## 2017-12-20 VITALS — SYSTOLIC BLOOD PRESSURE: 139 MMHG | DIASTOLIC BLOOD PRESSURE: 68 MMHG

## 2017-12-20 LAB
ANION GAP SERPL CALC-SCNC: 9 MEQ/L (ref 8–16)
BASOPHILS # BLD AUTO: 0 10^3/UL (ref 0–0.2)
BASOPHILS NFR BLD AUTO: 0.2 % (ref 0–1)
BUN SERPL-MCNC: 34 MG/DL (ref 7–18)
CALCIUM SERPL-MCNC: 8.1 MG/DL (ref 8.8–10.2)
CHLORIDE SERPL-SCNC: 105 MEQ/L (ref 98–107)
CO2 SERPL-SCNC: 23 MEQ/L (ref 21–32)
CREAT SERPL-MCNC: 1.23 MG/DL (ref 0.7–1.3)
EOSINOPHIL # BLD AUTO: 0.1 10^3/UL (ref 0–0.5)
EOSINOPHIL NFR BLD AUTO: 1.2 % (ref 0–3)
ERYTHROCYTE [DISTWIDTH] IN BLOOD BY AUTOMATED COUNT: 13.9 % (ref 11.5–14.5)
GFR SERPL CREATININE-BSD FRML MDRD: > 60 ML/MIN/{1.73_M2} (ref 42–?)
GLUCOSE SERPL-MCNC: 178 MG/DL (ref 83–110)
IMM GRANULOCYTES NFR BLD: 1 % (ref 0–0)
LYMPHOCYTES # BLD AUTO: 0.9 10^3/UL (ref 1.5–4.5)
LYMPHOCYTES NFR BLD AUTO: 11 % (ref 24–44)
MAGNESIUM SERPL-MCNC: 2.4 MG/DL (ref 1.8–2.4)
MCH RBC QN AUTO: 35.3 PG (ref 27–33)
MCHC RBC AUTO-ENTMCNC: 34.9 G/DL (ref 32–36.5)
MCV RBC AUTO: 101.2 FL (ref 80–96)
MONOCYTES # BLD AUTO: 0.8 10^3/UL (ref 0–0.8)
MONOCYTES NFR BLD AUTO: 9 % (ref 0–5)
NEUTROPHILS # BLD AUTO: 6.5 10^3/UL (ref 1.8–7.7)
NEUTROPHILS NFR BLD AUTO: 77.6 % (ref 36–66)
NRBC BLD AUTO-RTO: 0 % (ref 0–0)
PLATELET # BLD AUTO: 180 10^3/UL (ref 150–450)
POTASSIUM SERPL-SCNC: 3.9 MEQ/L (ref 3.5–5.1)
SODIUM SERPL-SCNC: 137 MEQ/L (ref 136–145)
WBC # BLD AUTO: 8.3 10^3/UL (ref 4–10)

## 2017-12-20 RX ADMIN — ACYCLOVIR SCH MG: 200 CAPSULE ORAL at 08:53

## 2017-12-20 RX ADMIN — VORICONAZOLE SCH MG: 200 TABLET ORAL at 08:53

## 2017-12-20 RX ADMIN — DIGOXIN SCH MG: 125 TABLET ORAL at 08:54

## 2017-12-20 RX ADMIN — METOPROLOL TARTRATE SCH MG: 25 TABLET, FILM COATED ORAL at 08:53

## 2017-12-20 RX ADMIN — PIPERACILLIN SODIUM AND TAZOBACTAM SODIUM SCH MLS/HR: 36; 4.5 INJECTION, POWDER, FOR SOLUTION INTRAVENOUS at 05:12

## 2017-12-20 RX ADMIN — HYDROXYCHLOROQUINE SULFATE SCH MG: 200 TABLET, FILM COATED ENTERAL at 08:54

## 2017-12-20 RX ADMIN — FOLIC ACID SCH MG: 1 TABLET ORAL at 08:53

## 2017-12-20 RX ADMIN — OMEPRAZOLE SCH MG: 20 CAPSULE, DELAYED RELEASE ORAL at 08:53

## 2017-12-20 RX ADMIN — FLUTICASONE PROPIONATE AND SALMETEROL XINAFOATE SCH PUFF: 230; 21 AEROSOL, METERED RESPIRATORY (INHALATION) at 07:19

## 2017-12-20 RX ADMIN — DOCUSATE SODIUM SCH MG: 100 CAPSULE, LIQUID FILLED ORAL at 08:53

## 2017-12-20 RX ADMIN — Medication SCH EA: at 08:54

## 2017-12-20 RX ADMIN — SODIUM CHLORIDE SCH UNITS: 4.5 INJECTION, SOLUTION INTRAVENOUS at 05:12

## 2017-12-20 RX ADMIN — Medication SCH UNITS: at 08:53

## 2017-12-20 NOTE — DSES
DATE OF ADMISSION: 12/19/2017

DATE OF DISCHARGE: 12/20/2017

 

ATTENDING PHYSICIAN: Dr. Alma Turcios, Dr. Tomasz Graves

 

PRIMARY CARE PROVIDER: Dr. Oleg Bradshaw

 

REFERRING PHYSICIAN: None.

 

CONSULTING PHYSICIAN: None.

 

CONDITION ON DISCHARGE: Stable.

 

FINAL DIAGNOSIS:

Dyspnea, likely secondary to community-acquired pneumonia with positive blood

cultures.

 

PROCEDURES: None.

 

HISTORY OF PRESENT ILLNESS: The patient is a 77-year-old  male with a

past medical history of myelodysplastic syndrome status post bone marrow

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

artery disease status post coronary artery bypass graft (CABG) times three in

2014, diabetes mellitus type 2, pericardial effusion in 2014,  who presented to

the emergency room (ER) with cough and shortness of breath, was found to have

pneumonia and admitted to the hospitalist service.

 

HOSPITAL COURSE:

1. Dyspnea, likely secondary to community-acquired pneumonia. He presented with

shortness of breath and productive cough. He has immunosuppression because of

chemotherapy. Blood cultures on 12/18/2017 were positive for Streptococcus

pneumoniae. Repeat blood cultures on 12/19/2017 have been negative after 24

hours. The patient's white blood cell count has been improving, along with lactic

acidosis. CT of the chest on 12/18/2017 revealed increasing size of the left

lower pulmonary consolidation. He has been put on vancomycin and Zosyn. Today is

day number three. He has been switched over to cefdinir based on susceptibilities

of his blood cultures.

 

2. Chest pain, likely secondary to pleuritic nature. CT chest done 12/18/2017 was

negative for pulmonary embolism (PE). Troponin times three has been negative.

 

3. Myelodysplastic syndrome, status post bone marrow transplant in 2012, status

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

 

4. Coronary artery disease, status post coronary artery bypass graft (CABG) times

three in 2014. Continue with aspirin and atorvastatin.

 

5. Dyslipidemia. Continue with atorvastatin.

 

6. Diabetes mellitus, type 2. Continue with insulin sliding scale.

 

7. Hypertension. Continue with metoprolol.

 

8. Pericardial effusion in 2014. Resolution of effusion on CT angiogram done

during this hospitalization.

 

9. Chronic obstructive pulmonary disease (COPD). No evidence of exacerbation.

Continue with Singulair, Advair, and he has been continued with DuoNeb as needed

while inpatient.

 

10. Benign prostatic hypertrophy (BPH). Continue with dutasteride.

 

11. Gastroesophageal reflux disease (GERD). Continue with omeprazole.

 

12. Deep vein thrombosis (DVT) prophylaxis. Continue with heparin.

 

DISCHARGE MEDICATIONS: The patient is being discharged home with the following

medication list:

- cefdinir 300 mg by mouth twice a day for the remainder of the antibiotic course

 

- acyclovir 400 mg by mouth twice a day

- alendronate 10 mg by mouth daily

- aspirin 81 mg by mouth at bedtime

- atorvastatin 20 mg by mouth at bedtime

- calcium with vitamin D one tablet by mouth twice a day

- cetirizine 10 mg by mouth at bedtime

- colecalciferol 6000 units by mouth daily

- digoxin 125 mcg by mouth daily

- docusate sodium 100 mg by mouth twice a day

- doxycycline 100 mg by mouth daily

- dutasteride 0.5 mg by mouth at bedtime

- fish oil two capsules by mouth twice a day

- folic acid 1 mg by mouth daily

- hydroxychloroquine 200 mg by mouth twice a day

- Imatinib 200 mg by mouth daily

- metformin 500 mg by mouth at bedtime

- metoprolol tartrate 12.5 mg by mouth twice a day

- magnesium plus protein one tablet by mouth three times a day

- montelukast 10 mg by mouth every evening

- omeprazole 20 mg by mouth daily

- Advair Diskus 500/50 mcg one puff inhaled twice a day

- Bactrim one tablet by mouth three times a week

- ____________________ cortisol 200 mg by mouth twice a day

- ____________________  0.8 mg by mouth twice a day

 

Stopped medications include amoxicillin 2000 mg by mouth as directed.

 

DISCHARGE INSTRUCTIONS: The patient has been advised to followup with his primary

care provider, Dr. Oleg Bradshaw, as well as his bone marrow transplant physician

within the next seven days. He has been advised to remain compliant with

treatment plan and medications and return to the emergency room if he experiences

any problems.

 

TIME SPENT ON DISCHARGE: Greater than 35 minutes.

## 2018-03-28 ENCOUNTER — HOSPITAL ENCOUNTER (OUTPATIENT)
Dept: HOSPITAL 53 - M RAD | Age: 78
End: 2018-03-28
Attending: OTOLARYNGOLOGY
Payer: MEDICARE

## 2018-03-28 DIAGNOSIS — E04.1: Primary | ICD-10-CM

## 2018-03-28 LAB
BLOOD UREA NITROGEN: 32 MG/DL (ref 7–18)
CREATININE FOR GFR: 1.14 MG/DL (ref 0.7–1.3)
GFR SERPL CREATININE-BSD FRML MDRD: > 60 ML/MIN/{1.73_M2} (ref 42–?)

## 2018-03-30 ENCOUNTER — HOSPITAL ENCOUNTER (OUTPATIENT)
Dept: HOSPITAL 53 - M LAB REF | Age: 78
End: 2018-03-30
Attending: OTOLARYNGOLOGY
Payer: MEDICARE

## 2018-03-30 DIAGNOSIS — C44.222: Primary | ICD-10-CM

## 2018-03-30 PROCEDURE — 88305 TISSUE EXAM BY PATHOLOGIST: CPT

## 2018-04-16 ENCOUNTER — HOSPITAL ENCOUNTER (OUTPATIENT)
Dept: HOSPITAL 53 - M SDC | Age: 78
Discharge: HOME | End: 2018-04-16
Attending: OTOLARYNGOLOGY
Payer: MEDICARE

## 2018-04-16 DIAGNOSIS — Z79.82: ICD-10-CM

## 2018-04-16 DIAGNOSIS — E78.5: ICD-10-CM

## 2018-04-16 DIAGNOSIS — C44.222: ICD-10-CM

## 2018-04-16 DIAGNOSIS — D46.9: ICD-10-CM

## 2018-04-16 DIAGNOSIS — Z79.899: ICD-10-CM

## 2018-04-16 DIAGNOSIS — Z87.891: ICD-10-CM

## 2018-04-16 DIAGNOSIS — Z79.51: ICD-10-CM

## 2018-04-16 DIAGNOSIS — I25.10: ICD-10-CM

## 2018-04-16 DIAGNOSIS — E11.9: ICD-10-CM

## 2018-04-16 DIAGNOSIS — I25.2: ICD-10-CM

## 2018-04-16 DIAGNOSIS — C92.00: ICD-10-CM

## 2018-04-16 DIAGNOSIS — K21.9: ICD-10-CM

## 2018-04-16 DIAGNOSIS — Z92.21: ICD-10-CM

## 2018-04-16 DIAGNOSIS — C44.42: Primary | ICD-10-CM

## 2018-04-16 LAB — GLUCOSE BLDC GLUCOMTR-MCNC: 164 MG/DL (ref 83–110)

## 2018-04-16 PROCEDURE — 11623 EXC S/N/H/F/G MAL+MRG 2.1-3: CPT

## 2018-04-16 RX ADMIN — LIDOCAINE HYDROCHLORIDE,EPINEPHRINE BITARTRATE 1 ML: 10; .01 INJECTION, SOLUTION INFILTRATION; PERINEURAL at 09:17

## 2018-04-16 RX ADMIN — BUPIVACAINE HYDROCHLORIDE AND EPINEPHRINE BITARTRATE 1 ML: 5; .005 INJECTION, SOLUTION EPIDURAL; INTRACAUDAL; PERINEURAL at 09:58

## 2018-04-16 RX ADMIN — BUPIVACAINE HYDROCHLORIDE 1 ML: 2.5 INJECTION, SOLUTION EPIDURAL; INFILTRATION; INTRACAUDAL at 11:17

## 2018-04-16 RX ADMIN — BACITRACIN 1 DOSE: 500 OINTMENT TOPICAL at 12:40

## 2018-04-16 RX ADMIN — SODIUM CHLORIDE, POTASSIUM CHLORIDE, SODIUM LACTATE AND CALCIUM CHLORIDE 1 MLS/HR: 600; 310; 30; 20 INJECTION, SOLUTION INTRAVENOUS at 08:45

## 2018-04-16 RX ADMIN — BUPIVACAINE HYDROCHLORIDE 1 ML: 2.5 INJECTION, SOLUTION EPIDURAL; INFILTRATION; INTRACAUDAL at 12:40

## 2018-06-24 ENCOUNTER — HOSPITAL ENCOUNTER (INPATIENT)
Dept: HOSPITAL 53 - M ED | Age: 78
LOS: 3 days | Discharge: HOME | DRG: 92 | End: 2018-06-27
Attending: HOSPITALIST | Admitting: INTERNAL MEDICINE
Payer: MEDICARE

## 2018-06-24 DIAGNOSIS — G92: Primary | ICD-10-CM

## 2018-06-24 DIAGNOSIS — E83.42: ICD-10-CM

## 2018-06-24 DIAGNOSIS — I10: ICD-10-CM

## 2018-06-24 DIAGNOSIS — D89.811: ICD-10-CM

## 2018-06-24 DIAGNOSIS — N40.0: ICD-10-CM

## 2018-06-24 DIAGNOSIS — T46.0X5A: ICD-10-CM

## 2018-06-24 DIAGNOSIS — R27.0: ICD-10-CM

## 2018-06-24 DIAGNOSIS — Z94.81: ICD-10-CM

## 2018-06-24 DIAGNOSIS — Z85.828: ICD-10-CM

## 2018-06-24 DIAGNOSIS — Z95.1: ICD-10-CM

## 2018-06-24 DIAGNOSIS — Z88.4: ICD-10-CM

## 2018-06-24 DIAGNOSIS — K21.9: ICD-10-CM

## 2018-06-24 DIAGNOSIS — I25.10: ICD-10-CM

## 2018-06-24 DIAGNOSIS — K59.00: ICD-10-CM

## 2018-06-24 DIAGNOSIS — Z87.891: ICD-10-CM

## 2018-06-24 DIAGNOSIS — Z79.84: ICD-10-CM

## 2018-06-24 DIAGNOSIS — J45.909: ICD-10-CM

## 2018-06-24 DIAGNOSIS — E83.51: ICD-10-CM

## 2018-06-24 DIAGNOSIS — D46.9: ICD-10-CM

## 2018-06-24 DIAGNOSIS — I48.91: ICD-10-CM

## 2018-06-24 DIAGNOSIS — E11.9: ICD-10-CM

## 2018-06-24 DIAGNOSIS — Z79.899: ICD-10-CM

## 2018-06-24 DIAGNOSIS — D53.9: ICD-10-CM

## 2018-06-24 DIAGNOSIS — E78.5: ICD-10-CM

## 2018-06-24 DIAGNOSIS — Z79.82: ICD-10-CM

## 2018-06-24 DIAGNOSIS — R25.1: ICD-10-CM

## 2018-06-24 LAB
ACETAMINOPHEN LEVEL: < 2 UG/ML (ref 10–30)
ALBUMIN/GLOBULIN RATIO: 1.15 (ref 1–1.93)
ALBUMIN: 3.1 GM/DL (ref 3.2–5.2)
ALKALINE PHOSPHATASE: 69 U/L (ref 45–117)
ALT SERPL W P-5'-P-CCNC: 38 U/L (ref 12–78)
AMMONIA PLAS-SCNC: 14 UMOL/L (ref ?–32)
AMPHETAMINES UR QL SCN: NEGATIVE
ANION GAP: 8 MEQ/L (ref 8–16)
AST SERPL-CCNC: 36 U/L (ref 7–37)
BARBITURATES UR QL SCN: NEGATIVE
BASO #: 0 10^3/UL (ref 0–0.2)
BASO %: 0.5 % (ref 0–1)
BENZODIAZ UR QL SCN: NEGATIVE
BILIRUB CONJ SERPL-MCNC: 0.1 MG/DL (ref 0–0.2)
BILIRUBIN,TOTAL: 0.5 MG/DL (ref 0.2–1)
BLOOD UREA NITROGEN: 25 MG/DL (ref 7–18)
BZE UR QL SCN: NEGATIVE
CALCIUM LEVEL: 8.3 MG/DL (ref 8.8–10.2)
CANNABINOIDS UR QL SCN: NEGATIVE
CARBON DIOXIDE LEVEL: 25 MEQ/L (ref 21–32)
CHLORIDE LEVEL: 107 MEQ/L (ref 98–107)
CK MB CFR.DF SERPL CALC: 1.42
CK SERPL-CCNC: 204 U/L (ref 39–308)
CK-MB VALUE MASS: 2.9 NG/ML (ref ?–3.6)
CREATININE FOR GFR: 1.07 MG/DL (ref 0.7–1.3)
DIGOXIN LEVEL: 3.8 NG/ML (ref 0.5–2)
EOS #: 0.1 10^3/UL (ref 0–0.5)
EOSINOPHIL NFR BLD AUTO: 1.4 % (ref 0–3)
ETHYL ALCOHOL (ETHANOL): < 0.003 % (ref 0–0.01)
GFR SERPL CREATININE-BSD FRML MDRD: > 60 ML/MIN/{1.73_M2} (ref 42–?)
GLUCOSE BLDC GLUCOMTR-MCNC: 147 MG/DL (ref 83–110)
GLUCOSE, FASTING: 155 MG/DL (ref 70–100)
HEMATOCRIT: 30.3 % (ref 42–52)
HEMOGLOBIN: 10.1 G/DL (ref 13.5–17.5)
IMMATURE GRANULOCYTE %: 0.6 % (ref 0–3)
LACTIC ACID SEPSIS PROTOCOL: 1.5 MMOL/L (ref 0.4–2)
LEUKOCYTE ESTERASE UR AUTO RFX: NEGATIVE
LYMPH #: 1.6 10^3/UL (ref 1.5–4.5)
LYMPH %: 23.9 % (ref 24–44)
MEAN CORPUSCULAR HEMOGLOBIN: 34.4 PG (ref 27–33)
MEAN CORPUSCULAR HGB CONC: 33.3 G/DL (ref 32–36.5)
MEAN CORPUSCULAR VOLUME: 103.1 FL (ref 80–96)
METHADONE URINE: NEGATIVE
MONO #: 0.7 10^3/UL (ref 0–0.8)
MONO %: 10.8 % (ref 0–5)
NEUTROPHILS #: 4.1 10^3/UL (ref 1.8–7.7)
NEUTROPHILS %: 62.8 % (ref 36–66)
NRBC BLD AUTO-RTO: 0 % (ref 0–0)
OPIATES UR QL SCN: NEGATIVE
OSMOLALITY SERUM: 297 MOSM/KG (ref 280–301)
PCP UR QL SCN: NEGATIVE
PLATELET COUNT, AUTOMATED: 196 10^3/UL (ref 150–450)
POTASSIUM SERUM: 4.9 MEQ/L (ref 3.5–5.1)
RED BLOOD COUNT: 2.94 10^6/UL (ref 4.3–6.1)
RED CELL DISTRIBUTION WIDTH: 13.3 % (ref 11.5–14.5)
SALICYLATE LEVEL: < 1.7 MG/DL (ref 5–30)
SODIUM LEVEL: 140 MEQ/L (ref 136–145)
SPECIFIC GRAVITY UR AUTO RFX: 1.02 (ref 1–1.03)
SQUAM EPITHELIAL CELL UR AURFX: 0 /HPF (ref 0–6)
THYROID STIMULATING HORMONE: 2.49 UIU/ML (ref 0.36–3.74)
TOTAL PROTEIN: 5.8 GM/DL (ref 6.4–8.2)
TROPONIN I: 0.08 NG/ML (ref ?–0.1)
VENOUS BASE EXCESS: -0.6 (ref -2–2)
VENOUS HCO3: 23.9 MEQ/L (ref 23–27)
VENOUS O2 SATURATION: 94.7 % (ref 60–80)
VENOUS PARTIAL PRESSURE CO2: 38.8 MMHG (ref 38–50)
VENOUS PARTIAL PRESSURE O2: 73.2 MMHG (ref 30–50)
VENOUS PH: 7.41 UNITS (ref 7.33–7.43)
VENOUS STANDARD HCO3: 23.9 MEQ/L
VENOUS TOTAL CO2: 25.1 MEQ/L (ref 24–28)
WHITE BLOOD COUNT: 6.6 10^3/UL (ref 4–10)

## 2018-06-24 RX ADMIN — SODIUM CHLORIDE, PRESERVATIVE FREE 1 ML: 5 INJECTION INTRAVENOUS at 22:00

## 2018-06-24 RX ADMIN — HYDROXYCHLOROQUINE SULFATE 1 MG: 200 TABLET, FILM COATED ENTERAL at 20:41

## 2018-06-24 RX ADMIN — INSULIN LISPRO 2 UNITS: 100 INJECTION, SOLUTION INTRAVENOUS; SUBCUTANEOUS at 19:30

## 2018-06-24 RX ADMIN — SODIUM CHLORIDE 1 MG: 9 INJECTION, SOLUTION INTRAVENOUS at 18:05

## 2018-06-24 RX ADMIN — MONTELUKAST SODIUM 1 MG: 10 TABLET, FILM COATED ORAL at 19:29

## 2018-06-24 RX ADMIN — ASPIRIN 1 MG: 81 TABLET ORAL at 20:41

## 2018-06-24 RX ADMIN — FLUTICASONE PROPIONATE AND SALMETEROL XINAFOATE 1 PUFF: 230; 21 AEROSOL, METERED RESPIRATORY (INHALATION) at 21:00

## 2018-06-24 RX ADMIN — MECLIZINE HYDROCHLORIDE 1 MG: 25 TABLET ORAL at 14:01

## 2018-06-24 RX ADMIN — LABETALOL HYDROCHLORIDE 1 MG: 100 TABLET, FILM COATED ORAL at 20:41

## 2018-06-24 RX ADMIN — DUTASTERIDE 1 MG: 0.5 CAPSULE, LIQUID FILLED ORAL at 20:41

## 2018-06-24 RX ADMIN — FOLIC ACID 1 MG: 1 TABLET ORAL at 19:29

## 2018-06-24 RX ADMIN — ATORVASTATIN CALCIUM 1 MG: 20 TABLET, FILM COATED ORAL at 20:41

## 2018-06-24 RX ADMIN — ACYCLOVIR 1 MG: 200 CAPSULE ORAL at 20:41

## 2018-06-25 LAB
ALBUMIN/GLOBULIN RATIO: 1.07 (ref 1–1.93)
ALBUMIN: 2.9 GM/DL (ref 3.2–5.2)
ALKALINE PHOSPHATASE: 70 U/L (ref 45–117)
ALT SERPL W P-5'-P-CCNC: 41 U/L (ref 12–78)
ANION GAP: 9 MEQ/L (ref 8–16)
APPEARANCE, CSF: CLEAR
APPEARANCE, CSF: CLEAR
AST SERPL-CCNC: 32 U/L (ref 7–37)
BASO #: 0 10^3/UL (ref 0–0.2)
BASO %: 0.3 % (ref 0–1)
BILIRUBIN,TOTAL: 0.6 MG/DL (ref 0.2–1)
BLOOD UREA NITROGEN: 22 MG/DL (ref 7–18)
CALCIUM LEVEL: 8.2 MG/DL (ref 8.8–10.2)
CARBON DIOXIDE LEVEL: 24 MEQ/L (ref 21–32)
CHLORIDE LEVEL: 107 MEQ/L (ref 98–107)
COLOR, CSF: COLORLESS
COLOR, CSF: COLORLESS
CREATININE FOR GFR: 1 MG/DL (ref 0.7–1.3)
CSF DIFF IF INDICATED?: NO
CSF DIFF IF INDICATED?: NO
CSF RBC: < 2 10^3/UL (ref ?–2)
CSF RBC: < 2 10^3/UL (ref ?–2)
CSF WBC: 4 /UL (ref 0–10)
CSF WBC: 4 /UL (ref 0–10)
DIGOXIN LEVEL: 6.7 NG/ML (ref 0.5–2)
EOS #: 0.1 10^3/UL (ref 0–0.5)
EOSINOPHIL NFR BLD AUTO: 1.9 % (ref 0–3)
GFR SERPL CREATININE-BSD FRML MDRD: > 60 ML/MIN/{1.73_M2} (ref 42–?)
GLUCOSE BLDC GLUCOMTR-MCNC: 126 MG/DL (ref 83–110)
GLUCOSE BLDC GLUCOMTR-MCNC: 179 MG/DL (ref 83–110)
GLUCOSE CSF: 75 MG/DL (ref 40–75)
GLUCOSE, FASTING: 137 MG/DL (ref 70–100)
HEMATOCRIT: 28.3 % (ref 42–52)
HEMOGLOBIN: 9.7 G/DL (ref 13.5–17.5)
IMMATURE GRANULOCYTE %: 0.6 % (ref 0–3)
LYMPH #: 1.6 10^3/UL (ref 1.5–4.5)
LYMPH %: 25.4 % (ref 24–44)
MAGNESIUM LEVEL: 1.6 MG/DL (ref 1.8–2.4)
MEAN CORPUSCULAR HEMOGLOBIN: 35 PG (ref 27–33)
MEAN CORPUSCULAR HGB CONC: 34.3 G/DL (ref 32–36.5)
MEAN CORPUSCULAR VOLUME: 102.2 FL (ref 80–96)
MONO #: 0.7 10^3/UL (ref 0–0.8)
MONO %: 10.3 % (ref 0–5)
NEUTROPHILS #: 3.9 10^3/UL (ref 1.8–7.7)
NEUTROPHILS %: 61.5 % (ref 36–66)
NRBC BLD AUTO-RTO: 0 % (ref 0–0)
PLATELET COUNT, AUTOMATED: 186 10^3/UL (ref 150–450)
POTASSIUM SERUM: 4.5 MEQ/L (ref 3.5–5.1)
RED BLOOD COUNT: 2.77 10^6/UL (ref 4.3–6.1)
RED CELL DISTRIBUTION WIDTH: 13.2 % (ref 11.5–14.5)
SODIUM LEVEL: 140 MEQ/L (ref 136–145)
SPECIMEN SOURCE: (no result)
SPECIMEN SOURCE: (no result)
TOTAL PROTEIN,CSF: 82.4 MG/DL (ref 15–45)
TOTAL PROTEIN: 5.6 GM/DL (ref 6.4–8.2)
TUBE # CSF: (no result)
TUBE # CSF: (no result)
WHITE BLOOD COUNT: 6.3 10^3/UL (ref 4–10)

## 2018-06-25 PROCEDURE — 009U3ZX DRAINAGE OF SPINAL CANAL, PERCUTANEOUS APPROACH, DIAGNOSTIC: ICD-10-PCS

## 2018-06-25 RX ADMIN — DUTASTERIDE 1 MG: 0.5 CAPSULE, LIQUID FILLED ORAL at 21:26

## 2018-06-25 RX ADMIN — ENOXAPARIN SODIUM 1 MG: 40 INJECTION SUBCUTANEOUS at 09:00

## 2018-06-25 RX ADMIN — SODIUM CHLORIDE, PRESERVATIVE FREE 1 ML: 5 INJECTION INTRAVENOUS at 21:27

## 2018-06-25 RX ADMIN — LABETALOL HYDROCHLORIDE 1 MG: 100 TABLET, FILM COATED ORAL at 09:00

## 2018-06-25 RX ADMIN — DEXTROSE AND SODIUM CHLORIDE 1 MLS/HR: 5; 450 INJECTION, SOLUTION INTRAVENOUS at 12:26

## 2018-06-25 RX ADMIN — SULFAMETHOXAZOLE AND TRIMETHOPRIM 1 TAB: 800; 160 TABLET ORAL at 12:00

## 2018-06-25 RX ADMIN — ASPIRIN 1 MG: 81 TABLET ORAL at 21:25

## 2018-06-25 RX ADMIN — ACYCLOVIR 1 MG: 200 CAPSULE ORAL at 09:00

## 2018-06-25 RX ADMIN — DOXYCYCLINE HYCLATE 1 MG: 100 TABLET, COATED ORAL at 12:00

## 2018-06-25 RX ADMIN — Medication 1 EA: at 12:00

## 2018-06-25 RX ADMIN — INSULIN LISPRO 2 UNITS: 100 INJECTION, SOLUTION INTRAVENOUS; SUBCUTANEOUS at 17:42

## 2018-06-25 RX ADMIN — TIOTROPIUM BROMIDE 1 INHALATION: 18 CAPSULE ORAL; RESPIRATORY (INHALATION) at 08:00

## 2018-06-25 RX ADMIN — HYDROXYCHLOROQUINE SULFATE 1 MG: 200 TABLET, FILM COATED ENTERAL at 21:26

## 2018-06-25 RX ADMIN — MONTELUKAST SODIUM 1 MG: 10 TABLET, FILM COATED ORAL at 17:42

## 2018-06-25 RX ADMIN — LABETALOL HYDROCHLORIDE 1 MG: 100 TABLET, FILM COATED ORAL at 21:26

## 2018-06-25 RX ADMIN — HYDROXYCHLOROQUINE SULFATE 1 MG: 200 TABLET, FILM COATED ENTERAL at 09:00

## 2018-06-25 RX ADMIN — Medication 1 EA: at 21:27

## 2018-06-25 RX ADMIN — INSULIN LISPRO 1 UNITS: 100 INJECTION, SOLUTION INTRAVENOUS; SUBCUTANEOUS at 07:30

## 2018-06-25 RX ADMIN — OMEPRAZOLE 1 MG: 20 CAPSULE, DELAYED RELEASE ORAL at 09:00

## 2018-06-25 RX ADMIN — ACYCLOVIR 1 MG: 200 CAPSULE ORAL at 21:25

## 2018-06-25 RX ADMIN — INSULIN LISPRO 1 UNITS: 100 INJECTION, SOLUTION INTRAVENOUS; SUBCUTANEOUS at 12:00

## 2018-06-25 RX ADMIN — SODIUM CHLORIDE, PRESERVATIVE FREE 1 ML: 5 INJECTION INTRAVENOUS at 12:27

## 2018-06-25 RX ADMIN — Medication 1 EA: at 09:00

## 2018-06-25 RX ADMIN — FLUTICASONE PROPIONATE AND SALMETEROL XINAFOATE 1 PUFF: 230; 21 AEROSOL, METERED RESPIRATORY (INHALATION) at 20:55

## 2018-06-25 RX ADMIN — FLUTICASONE PROPIONATE AND SALMETEROL XINAFOATE 1 PUFF: 230; 21 AEROSOL, METERED RESPIRATORY (INHALATION) at 09:00

## 2018-06-25 RX ADMIN — FOLIC ACID 1 MG: 1 TABLET ORAL at 12:00

## 2018-06-25 RX ADMIN — ATORVASTATIN CALCIUM 1 MG: 20 TABLET, FILM COATED ORAL at 21:25

## 2018-06-25 RX ADMIN — SODIUM CHLORIDE, PRESERVATIVE FREE 1 ML: 5 INJECTION INTRAVENOUS at 06:00

## 2018-06-26 LAB
ALBUMIN/GLOBULIN RATIO: 1.04 (ref 1–1.93)
ALBUMIN: 2.8 GM/DL (ref 3.2–5.2)
ALKALINE PHOSPHATASE: 66 U/L (ref 45–117)
ALT SERPL W P-5'-P-CCNC: 38 U/L (ref 12–78)
ANION GAP: 8 MEQ/L (ref 8–16)
AST SERPL-CCNC: 30 U/L (ref 7–37)
BASO #: 0 10^3/UL (ref 0–0.2)
BASO %: 0.3 % (ref 0–1)
BILIRUBIN,TOTAL: 0.5 MG/DL (ref 0.2–1)
BLOOD UREA NITROGEN: 23 MG/DL (ref 7–18)
CALCIUM LEVEL: 7.9 MG/DL (ref 8.8–10.2)
CARBON DIOXIDE LEVEL: 24 MEQ/L (ref 21–32)
CHLORIDE LEVEL: 105 MEQ/L (ref 98–107)
CREATININE FOR GFR: 1 MG/DL (ref 0.7–1.3)
DIGOXIN LEVEL: 5.8 NG/ML (ref 0.5–2)
EOS #: 0.1 10^3/UL (ref 0–0.5)
EOSINOPHIL NFR BLD AUTO: 0.9 % (ref 0–3)
GFR SERPL CREATININE-BSD FRML MDRD: > 60 ML/MIN/{1.73_M2} (ref 42–?)
GLUCOSE BLDC GLUCOMTR-MCNC: 152 MG/DL (ref 83–110)
GLUCOSE, FASTING: 151 MG/DL (ref 70–100)
HEMATOCRIT: 27 % (ref 42–52)
HEMOGLOBIN: 9.1 G/DL (ref 13.5–17.5)
IMMATURE GRANULOCYTE %: 0.6 % (ref 0–3)
LYMPH #: 1.4 10^3/UL (ref 1.5–4.5)
LYMPH %: 20.6 % (ref 24–44)
MAGNESIUM LEVEL: 1.6 MG/DL (ref 1.8–2.4)
MEAN CORPUSCULAR HEMOGLOBIN: 34.2 PG (ref 27–33)
MEAN CORPUSCULAR HGB CONC: 33.7 G/DL (ref 32–36.5)
MEAN CORPUSCULAR VOLUME: 101.5 FL (ref 80–96)
MONO #: 0.6 10^3/UL (ref 0–0.8)
MONO %: 8.9 % (ref 0–5)
NEUTROPHILS #: 4.6 10^3/UL (ref 1.8–7.7)
NEUTROPHILS %: 68.7 % (ref 36–66)
NRBC BLD AUTO-RTO: 0 % (ref 0–0)
PLATELET COUNT, AUTOMATED: 160 10^3/UL (ref 150–450)
POTASSIUM SERUM: 4.5 MEQ/L (ref 3.5–5.1)
RED BLOOD COUNT: 2.66 10^6/UL (ref 4.3–6.1)
RED CELL DISTRIBUTION WIDTH: 13.2 % (ref 11.5–14.5)
SODIUM LEVEL: 137 MEQ/L (ref 136–145)
TOTAL PROTEIN: 5.5 GM/DL (ref 6.4–8.2)
WHITE BLOOD COUNT: 6.8 10^3/UL (ref 4–10)

## 2018-06-26 RX ADMIN — ACYCLOVIR 1 MG: 200 CAPSULE ORAL at 09:54

## 2018-06-26 RX ADMIN — MAGNESIUM SULFATE IN DEXTROSE 1 MLS/HR: 10 INJECTION, SOLUTION INTRAVENOUS at 17:29

## 2018-06-26 RX ADMIN — INSULIN LISPRO 4 UNITS: 100 INJECTION, SOLUTION INTRAVENOUS; SUBCUTANEOUS at 17:30

## 2018-06-26 RX ADMIN — SODIUM CHLORIDE, PRESERVATIVE FREE 1 ML: 5 INJECTION INTRAVENOUS at 05:46

## 2018-06-26 RX ADMIN — Medication 1 EA: at 11:55

## 2018-06-26 RX ADMIN — MONTELUKAST SODIUM 1 MG: 10 TABLET, FILM COATED ORAL at 17:30

## 2018-06-26 RX ADMIN — ENOXAPARIN SODIUM 1 MG: 40 INJECTION SUBCUTANEOUS at 09:54

## 2018-06-26 RX ADMIN — ACYCLOVIR 1 MG: 200 CAPSULE ORAL at 20:49

## 2018-06-26 RX ADMIN — Medication 1 EA: at 07:53

## 2018-06-26 RX ADMIN — FOLIC ACID 1 MG: 1 TABLET ORAL at 11:54

## 2018-06-26 RX ADMIN — LABETALOL HYDROCHLORIDE 1 MG: 100 TABLET, FILM COATED ORAL at 20:49

## 2018-06-26 RX ADMIN — INSULIN LISPRO 4 UNITS: 100 INJECTION, SOLUTION INTRAVENOUS; SUBCUTANEOUS at 11:54

## 2018-06-26 RX ADMIN — DEXTROSE AND SODIUM CHLORIDE 1 MLS/HR: 5; 450 INJECTION, SOLUTION INTRAVENOUS at 03:40

## 2018-06-26 RX ADMIN — SODIUM CHLORIDE, PRESERVATIVE FREE 1 ML: 5 INJECTION INTRAVENOUS at 20:50

## 2018-06-26 RX ADMIN — Medication 1 EA: at 09:55

## 2018-06-26 RX ADMIN — MAGNESIUM SULFATE IN DEXTROSE 1 MLS/HR: 10 INJECTION, SOLUTION INTRAVENOUS at 18:33

## 2018-06-26 RX ADMIN — FLUTICASONE PROPIONATE AND SALMETEROL XINAFOATE 1 PUFF: 230; 21 AEROSOL, METERED RESPIRATORY (INHALATION) at 07:23

## 2018-06-26 RX ADMIN — SODIUM CHLORIDE, PRESERVATIVE FREE 1 ML: 5 INJECTION INTRAVENOUS at 12:21

## 2018-06-26 RX ADMIN — HYDROXYCHLOROQUINE SULFATE 1 MG: 200 TABLET, FILM COATED ENTERAL at 20:49

## 2018-06-26 RX ADMIN — INSULIN LISPRO 4 UNITS: 100 INJECTION, SOLUTION INTRAVENOUS; SUBCUTANEOUS at 07:55

## 2018-06-26 RX ADMIN — FLUTICASONE PROPIONATE AND SALMETEROL XINAFOATE 1 PUFF: 230; 21 AEROSOL, METERED RESPIRATORY (INHALATION) at 21:17

## 2018-06-26 RX ADMIN — Medication 1 EA: at 20:50

## 2018-06-26 RX ADMIN — ASPIRIN 1 MG: 81 TABLET ORAL at 20:49

## 2018-06-26 RX ADMIN — TIOTROPIUM BROMIDE 1 INHALATION: 18 CAPSULE ORAL; RESPIRATORY (INHALATION) at 07:23

## 2018-06-26 RX ADMIN — LABETALOL HYDROCHLORIDE 1 MG: 100 TABLET, FILM COATED ORAL at 09:55

## 2018-06-26 RX ADMIN — DUTASTERIDE 1 MG: 0.5 CAPSULE, LIQUID FILLED ORAL at 20:49

## 2018-06-26 RX ADMIN — ACETAMINOPHEN 1 MG: 500 TABLET ORAL at 07:54

## 2018-06-26 RX ADMIN — DOXYCYCLINE HYCLATE 1 MG: 100 TABLET, COATED ORAL at 11:54

## 2018-06-26 RX ADMIN — HYDROXYCHLOROQUINE SULFATE 1 MG: 200 TABLET, FILM COATED ENTERAL at 09:54

## 2018-06-26 RX ADMIN — OMEPRAZOLE 1 MG: 20 CAPSULE, DELAYED RELEASE ORAL at 09:55

## 2018-06-26 RX ADMIN — ATORVASTATIN CALCIUM 1 MG: 20 TABLET, FILM COATED ORAL at 20:49

## 2018-06-27 LAB
ALBUMIN/GLOBULIN RATIO: 1.2 (ref 1–1.93)
ALBUMIN: 3 GM/DL (ref 3.2–5.2)
ALKALINE PHOSPHATASE: 68 U/L (ref 45–117)
ALT SERPL W P-5'-P-CCNC: 36 U/L (ref 12–78)
ANION GAP: 9 MEQ/L (ref 8–16)
AST SERPL-CCNC: 30 U/L (ref 7–37)
BASO #: 0 10^3/UL (ref 0–0.2)
BASO %: 0.3 % (ref 0–1)
BILIRUBIN,TOTAL: 0.5 MG/DL (ref 0.2–1)
BLOOD UREA NITROGEN: 23 MG/DL (ref 7–18)
CALCIUM LEVEL: 8 MG/DL (ref 8.8–10.2)
CARBON DIOXIDE LEVEL: 23 MEQ/L (ref 21–32)
CHLORIDE LEVEL: 105 MEQ/L (ref 98–107)
CREATININE FOR GFR: 1.17 MG/DL (ref 0.7–1.3)
DIGOXIN LEVEL: 4.7 NG/ML (ref 0.5–2)
EOS #: 0.1 10^3/UL (ref 0–0.5)
EOSINOPHIL NFR BLD AUTO: 1.8 % (ref 0–3)
FK 506 (TACROLIMUS) LABCORP: 5.3 NG/ML (ref 2–20)
GFR SERPL CREATININE-BSD FRML MDRD: > 60 ML/MIN/{1.73_M2} (ref 42–?)
GLUCOSE, FASTING: 145 MG/DL (ref 70–100)
HEMATOCRIT: 27 % (ref 42–52)
HEMOGLOBIN: 9.1 G/DL (ref 13.5–17.5)
IMMATURE GRANULOCYTE %: 0.7 % (ref 0–3)
LYMPH #: 1.4 10^3/UL (ref 1.5–4.5)
LYMPH %: 22.7 % (ref 24–44)
MAGNESIUM LEVEL: 2 MG/DL (ref 1.8–2.4)
MEAN CORPUSCULAR HEMOGLOBIN: 34.6 PG (ref 27–33)
MEAN CORPUSCULAR HGB CONC: 33.7 G/DL (ref 32–36.5)
MEAN CORPUSCULAR VOLUME: 102.7 FL (ref 80–96)
MONO #: 0.7 10^3/UL (ref 0–0.8)
MONO %: 11.5 % (ref 0–5)
NEUTROPHILS #: 3.8 10^3/UL (ref 1.8–7.7)
NEUTROPHILS %: 63 % (ref 36–66)
NRBC BLD AUTO-RTO: 0 % (ref 0–0)
PLATELET COUNT, AUTOMATED: 165 10^3/UL (ref 150–450)
POTASSIUM SERUM: 4.6 MEQ/L (ref 3.5–5.1)
RED BLOOD COUNT: 2.63 10^6/UL (ref 4.3–6.1)
RED CELL DISTRIBUTION WIDTH: 13.4 % (ref 11.5–14.5)
SODIUM LEVEL: 137 MEQ/L (ref 136–145)
TOTAL PROTEIN: 5.5 GM/DL (ref 6.4–8.2)
WHITE BLOOD COUNT: 6 10^3/UL (ref 4–10)

## 2018-06-27 RX ADMIN — OMEPRAZOLE 1 MG: 20 CAPSULE, DELAYED RELEASE ORAL at 08:21

## 2018-06-27 RX ADMIN — HYDROXYCHLOROQUINE SULFATE 1 MG: 200 TABLET, FILM COATED ENTERAL at 08:21

## 2018-06-27 RX ADMIN — Medication 1 EA: at 08:23

## 2018-06-27 RX ADMIN — ENOXAPARIN SODIUM 1 MG: 40 INJECTION SUBCUTANEOUS at 08:22

## 2018-06-27 RX ADMIN — INSULIN LISPRO 2 UNITS: 100 INJECTION, SOLUTION INTRAVENOUS; SUBCUTANEOUS at 08:23

## 2018-06-27 RX ADMIN — SODIUM CHLORIDE, PRESERVATIVE FREE 1 ML: 5 INJECTION INTRAVENOUS at 06:09

## 2018-06-27 RX ADMIN — LABETALOL HYDROCHLORIDE 1 MG: 100 TABLET, FILM COATED ORAL at 08:21

## 2018-06-28 LAB — Lab: <3 AU/ML (ref 0–7.9)

## 2018-07-26 ENCOUNTER — HOSPITAL ENCOUNTER (EMERGENCY)
Dept: HOSPITAL 53 - M ED | Age: 78
Discharge: ANOTHER HOSPITAL | End: 2018-07-26
Payer: MEDICARE

## 2018-07-26 DIAGNOSIS — D46.9: ICD-10-CM

## 2018-07-26 DIAGNOSIS — Z79.84: ICD-10-CM

## 2018-07-26 DIAGNOSIS — Z95.1: ICD-10-CM

## 2018-07-26 DIAGNOSIS — Z79.899: ICD-10-CM

## 2018-07-26 DIAGNOSIS — Z88.4: ICD-10-CM

## 2018-07-26 DIAGNOSIS — Z79.82: ICD-10-CM

## 2018-07-26 DIAGNOSIS — I50.9: ICD-10-CM

## 2018-07-26 DIAGNOSIS — D89.810: ICD-10-CM

## 2018-07-26 DIAGNOSIS — Z79.51: ICD-10-CM

## 2018-07-26 DIAGNOSIS — I25.10: ICD-10-CM

## 2018-07-26 DIAGNOSIS — N18.3: ICD-10-CM

## 2018-07-26 DIAGNOSIS — K81.0: Primary | ICD-10-CM

## 2018-07-26 DIAGNOSIS — C92.A0: ICD-10-CM

## 2018-07-26 DIAGNOSIS — I13.0: ICD-10-CM

## 2018-07-26 DIAGNOSIS — Z94.81: ICD-10-CM

## 2018-07-26 DIAGNOSIS — E87.5: ICD-10-CM

## 2018-07-26 DIAGNOSIS — Z87.19: ICD-10-CM

## 2018-07-26 DIAGNOSIS — Z79.2: ICD-10-CM

## 2018-07-26 LAB
ALBUMIN/GLOBULIN RATIO: 0.91 (ref 1–1.93)
ALBUMIN: 3.1 GM/DL (ref 3.2–5.2)
ALKALINE PHOSPHATASE: 82 U/L (ref 45–117)
ALT SERPL W P-5'-P-CCNC: 38 U/L (ref 12–78)
ANION GAP: 6 MEQ/L (ref 8–16)
AST SERPL-CCNC: 43 U/L (ref 7–37)
BASO #: 0 10^3/UL (ref 0–0.2)
BASO %: 0.3 % (ref 0–1)
BILIRUB CONJ SERPL-MCNC: 0.1 MG/DL (ref 0–0.2)
BILIRUBIN,TOTAL: 0.7 MG/DL (ref 0.2–1)
BLOOD UREA NITROGEN: 46 MG/DL (ref 7–18)
CALCIUM LEVEL: 8.4 MG/DL (ref 8.8–10.2)
CARBON DIOXIDE LEVEL: 26 MEQ/L (ref 21–32)
CHLORIDE LEVEL: 107 MEQ/L (ref 98–107)
CREATININE FOR GFR: 1.57 MG/DL (ref 0.7–1.3)
EOS #: 0.1 10^3/UL (ref 0–0.5)
EOSINOPHIL NFR BLD AUTO: 0.7 % (ref 0–3)
GFR SERPL CREATININE-BSD FRML MDRD: 46.1 ML/MIN/{1.73_M2} (ref 42–?)
GLUCOSE, FASTING: 235 MG/DL (ref 70–100)
HEMATOCRIT: 29.3 % (ref 42–52)
HEMOGLOBIN: 9.4 G/DL (ref 13.5–17.5)
IMMATURE GRANULOCYTE %: 0.7 % (ref 0–3)
LACTIC ACID SEPSIS PROTOCOL: 1.3 MMOL/L (ref 0.4–2)
LIPASE: 137 U/L (ref 73–393)
LYMPH #: 0.8 10^3/UL (ref 1.5–4.5)
LYMPH %: 6.1 % (ref 24–44)
MEAN CORPUSCULAR HEMOGLOBIN: 34.1 PG (ref 27–33)
MEAN CORPUSCULAR HGB CONC: 32.1 G/DL (ref 32–36.5)
MEAN CORPUSCULAR VOLUME: 106.2 FL (ref 80–96)
MONO #: 0.9 10^3/UL (ref 0–0.8)
MONO %: 6.4 % (ref 0–5)
NEUTROPHILS #: 11.7 10^3/UL (ref 1.8–7.7)
NEUTROPHILS %: 85.8 % (ref 36–66)
NRBC BLD AUTO-RTO: 0 % (ref 0–0)
PLATELET COUNT, AUTOMATED: 284 10^3/UL (ref 150–450)
POTASSIUM SERUM: 5.9 MEQ/L (ref 3.5–5.1)
RED BLOOD COUNT: 2.76 10^6/UL (ref 4.3–6.1)
RED CELL DISTRIBUTION WIDTH: 15.9 % (ref 11.5–14.5)
SODIUM LEVEL: 139 MEQ/L (ref 136–145)
TOTAL PROTEIN: 6.5 GM/DL (ref 6.4–8.2)
WHITE BLOOD COUNT: 13.6 10^3/UL (ref 4–10)

## 2018-07-26 RX ADMIN — ONDANSETRON 1 MG: 2 INJECTION INTRAMUSCULAR; INTRAVENOUS at 13:22

## 2018-07-26 RX ADMIN — ONDANSETRON 1 MG: 2 INJECTION INTRAMUSCULAR; INTRAVENOUS at 02:46

## 2018-07-26 RX ADMIN — DEXTROSE MONOHYDRATE 1 MLS/HR: 5 INJECTION INTRAVENOUS at 05:49

## 2018-07-26 RX ADMIN — SODIUM CHLORIDE 1 MLS/HR: 9 INJECTION, SOLUTION INTRAVENOUS at 02:46

## 2018-07-26 RX ADMIN — MORPHINE SULFATE 1 MG: 4 INJECTION INTRAVENOUS at 03:06

## 2018-07-26 RX ADMIN — INSULIN HUMAN 1 UNITS: 100 INJECTION, SOLUTION PARENTERAL at 05:37

## 2018-07-26 RX ADMIN — MORPHINE SULFATE 1 MG: 4 INJECTION INTRAVENOUS at 13:22

## 2018-07-26 RX ADMIN — MORPHINE SULFATE 1 MG: 4 INJECTION INTRAVENOUS at 02:46

## 2018-07-26 RX ADMIN — DEXTROSE MONOHYDRATE 1 MLS/HR: 50 INJECTION, SOLUTION INTRAVENOUS at 05:49

## 2018-07-26 RX ADMIN — SODIUM CHLORIDE 1 MLS/HR: 9 INJECTION, SOLUTION INTRAVENOUS at 05:49

## 2018-07-26 RX ADMIN — DEXTROSE MONOHYDRATE 1 ML: 25 INJECTION, SOLUTION INTRAVENOUS at 05:37

## 2018-08-09 ENCOUNTER — HOSPITAL ENCOUNTER (OUTPATIENT)
Dept: HOSPITAL 53 - M LAB REF | Age: 78
End: 2018-08-09
Attending: OTOLARYNGOLOGY
Payer: MEDICARE

## 2018-08-09 DIAGNOSIS — H92.01: ICD-10-CM

## 2018-08-09 DIAGNOSIS — D46.9: ICD-10-CM

## 2018-08-09 DIAGNOSIS — C44.222: Primary | ICD-10-CM

## 2018-08-09 PROCEDURE — 87205 SMEAR GRAM STAIN: CPT

## 2018-08-29 ENCOUNTER — HOSPITAL ENCOUNTER (OUTPATIENT)
Dept: HOSPITAL 53 - M ONCR | Age: 78
LOS: 2 days | End: 2018-08-31
Attending: RADIOLOGY
Payer: MEDICARE

## 2018-08-29 DIAGNOSIS — C49.0: Primary | ICD-10-CM

## 2018-08-29 PROCEDURE — 77334 RADIATION TREATMENT AID(S): CPT

## 2018-09-07 ENCOUNTER — HOSPITAL ENCOUNTER (OUTPATIENT)
Dept: HOSPITAL 53 - M ONCR | Age: 78
LOS: 23 days | End: 2018-09-30
Attending: RADIOLOGY
Payer: MEDICARE

## 2018-09-07 DIAGNOSIS — C44.222: Primary | ICD-10-CM

## 2018-09-07 PROCEDURE — 77300 RADIATION THERAPY DOSE PLAN: CPT

## 2018-10-05 ENCOUNTER — HOSPITAL ENCOUNTER (OUTPATIENT)
Dept: HOSPITAL 53 - M INFU | Age: 78
Discharge: HOME | End: 2018-10-05
Attending: HOSPITALIST
Payer: MEDICARE

## 2018-10-05 ENCOUNTER — HOSPITAL ENCOUNTER (OUTPATIENT)
Dept: HOSPITAL 53 - M CARPUL | Age: 78
End: 2018-10-05
Attending: HOSPITALIST
Payer: MEDICARE

## 2018-10-05 DIAGNOSIS — D80.1: Primary | ICD-10-CM

## 2018-10-05 DIAGNOSIS — Z88.8: ICD-10-CM

## 2018-10-05 DIAGNOSIS — Z94.81: ICD-10-CM

## 2018-10-05 DIAGNOSIS — Z91.09: ICD-10-CM

## 2018-10-05 RX ADMIN — IMMUNE GLOBULIN INFUSION (HUMAN) 1 MLS/HR: 100 INJECTION, SOLUTION INTRAVENOUS; SUBCUTANEOUS at 12:28

## 2018-10-05 RX ADMIN — DIPHENHYDRAMINE HYDROCHLORIDE 1 MG: 50 INJECTION, SOLUTION INTRAMUSCULAR; INTRAVENOUS at 15:12

## 2018-10-05 RX ADMIN — IMMUNE GLOBULIN INFUSION (HUMAN) 1 MLS/HR: 100 INJECTION, SOLUTION INTRAVENOUS; SUBCUTANEOUS at 12:27

## 2018-10-10 NOTE — RADONC
RADIATION ONCOLOGY  PROGRESS NOTE:

 

DATE:  10/08/2018

 

CHART NUMBER:  

 

Mr. Corrigan is presently at a dose of 3200 cGy to his right ear and is tolerating

treatments quite well at this point with no significant difficulties related to

his radiation therapy.  He continues have some bleeding of the year but overall

is doing well.

 

REVIEW OF SYSTEMS:

The patient's review of systems is positive for some ear discomfort and bleeding

but is otherwise noncontributory.  He denies nausea, vomiting, fevers, chills,

night sweats, diplopia, headaches, anxiety or depression, anorexia, weight loss,

visual disturbances, chest pain, urinary or bowel difficulties, bone pain, or

neurological problems.

 

PHYSICAL EXAMINATION:

The patient is here is raw with some bleeding present; it is bandaged.  The

remainder of his physical exam remains unchanged.

 

Mr. Corrigan is tolerating treatments quite well and radiation will continue as

scheduled.

## 2018-10-16 NOTE — RADONC
RADIATION ONCOLOGY PROGRESS NOTE

 

DATE:  10/15/2018

 

CHART NUMBER:  

 

Mr. Corrigan is presently at a dose of 4200 cGy to his right ear and parotid region

and is tolerating treatments quite well at this point with no complaints related

to his radiation therapy.  He is having no particularly difficult time

swallowing.

 

REVIEW OF SYSTEMS:

The patient's review of systems is positive for weakness and his physical

limitations.  It is also positive for some diarrhea.  He is basically unchanged.

 

 

PHYSICAL EXAMINATION

The patient's skin is showing some erythema present. His oral cavity overall is

also good condition.  The remainder of the physical exam remains unchanged.

 

ASSESSMENT:

The patient is tolerating treatments quite well and radiation will continue as

scheduled.

## 2018-10-30 NOTE — RADONC
RADIATION ONCOLOGY PROGRESS NOTE

 

DATE: 10/29/2018

 

CHART NUMBER: 

 

PROGRESS NOTE:

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

currently receiving local regional radiotherapy and has had significant decrease

of his swelling.  His dose thus far is 6200 cGy of an anticipated 7000 cGy and

treatments are going relatively well.  He has clearing of his tumor by virtue of

the fact that his swelling and visible tumor has decreased substantially.

 

REVIEW OF SYSTEMS:

He denies any nausea, vomiting or pain within the irradiated area.  There is some

scab formation and significant decrease in the amount of swelling involving his

right parotid/right ear.  He has some noted skin changes with focal desquamation

in the irradiated area.  Alteration of taste is noted with dryness of mouth

however, the patient has noted this as an ongoing issue with him even before the

initiation of his radiotherapy.

 

EXAMINATION FINDINGS:

The skin within the irradiated volume as stated shows focal desquamation with a

significant tumor resolution.

 

No adenopathy is appreciated.

 

IMPRESSION:

Tolerating therapy well with a significant clinical response.

 

PLAN:

Treatments to continue.

## 2018-10-31 ENCOUNTER — HOSPITAL ENCOUNTER (OUTPATIENT)
Dept: HOSPITAL 53 - M ONCR | Age: 78
End: 2018-10-31
Attending: RADIOLOGY
Payer: MEDICARE

## 2018-10-31 DIAGNOSIS — C44.222: Primary | ICD-10-CM

## 2018-11-01 ENCOUNTER — HOSPITAL ENCOUNTER (OUTPATIENT)
Dept: HOSPITAL 53 - M ONCR | Age: 78
LOS: 29 days | End: 2018-11-30
Attending: RADIOLOGY
Payer: MEDICARE

## 2018-11-01 DIAGNOSIS — C44.222: Primary | ICD-10-CM

## 2018-11-01 PROCEDURE — 77386: CPT

## 2018-11-09 ENCOUNTER — HOSPITAL ENCOUNTER (OUTPATIENT)
Dept: HOSPITAL 53 - M CARPUL | Age: 78
End: 2018-11-09
Attending: HOSPITALIST
Payer: MEDICARE

## 2018-11-09 ENCOUNTER — HOSPITAL ENCOUNTER (OUTPATIENT)
Dept: HOSPITAL 53 - M INFU | Age: 78
Discharge: HOME | End: 2018-11-09
Attending: HOSPITALIST
Payer: MEDICARE

## 2018-11-09 DIAGNOSIS — D80.1: Primary | ICD-10-CM

## 2018-11-09 DIAGNOSIS — Z94.81: ICD-10-CM

## 2018-11-09 RX ADMIN — IMMUNE GLOBULIN INFUSION (HUMAN) 1 MLS/HR: 100 INJECTION, SOLUTION INTRAVENOUS; SUBCUTANEOUS at 09:12

## 2018-11-09 RX ADMIN — DIPHENHYDRAMINE HYDROCHLORIDE 1 MG: 50 INJECTION, SOLUTION INTRAMUSCULAR; INTRAVENOUS at 09:00

## 2018-11-09 RX ADMIN — IMMUNE GLOBULIN INFUSION (HUMAN) 1 MLS/HR: 100 INJECTION, SOLUTION INTRAVENOUS; SUBCUTANEOUS at 09:11

## 2018-11-09 RX ADMIN — IMMUNE GLOBULIN INFUSION (HUMAN) 1 MLS/HR: 100 INJECTION, SOLUTION INTRAVENOUS; SUBCUTANEOUS at 09:13

## 2018-11-09 RX ADMIN — ACETAMINOPHEN 1 MG: 325 TABLET ORAL at 09:00
